# Patient Record
Sex: FEMALE | Race: WHITE | HISPANIC OR LATINO | Employment: UNEMPLOYED | URBAN - METROPOLITAN AREA
[De-identification: names, ages, dates, MRNs, and addresses within clinical notes are randomized per-mention and may not be internally consistent; named-entity substitution may affect disease eponyms.]

---

## 2017-12-15 ENCOUNTER — TRANSCRIBE ORDERS (OUTPATIENT)
Dept: ADMINISTRATIVE | Facility: HOSPITAL | Age: 52
End: 2017-12-15

## 2017-12-15 DIAGNOSIS — Z12.31 ENCOUNTER FOR SCREENING MAMMOGRAM FOR MALIGNANT NEOPLASM OF BREAST: Primary | ICD-10-CM

## 2017-12-15 DIAGNOSIS — R10.32 LLQ PAIN: ICD-10-CM

## 2017-12-22 ENCOUNTER — HOSPITAL ENCOUNTER (OUTPATIENT)
Dept: RADIOLOGY | Age: 52
Discharge: HOME | End: 2017-12-22
Payer: COMMERCIAL

## 2017-12-22 ENCOUNTER — GENERIC CONVERSION - ENCOUNTER (OUTPATIENT)
Dept: OTHER | Facility: OTHER | Age: 52
End: 2017-12-22

## 2017-12-22 DIAGNOSIS — R10.32 LLQ PAIN: ICD-10-CM

## 2017-12-22 DIAGNOSIS — Z12.31 ENCOUNTER FOR SCREENING MAMMOGRAM FOR MALIGNANT NEOPLASM OF BREAST: ICD-10-CM

## 2017-12-22 PROCEDURE — 76830 TRANSVAGINAL US NON-OB: CPT

## 2017-12-22 PROCEDURE — 76856 US EXAM PELVIC COMPLETE: CPT

## 2017-12-22 PROCEDURE — G0202 SCR MAMMO BI INCL CAD: HCPCS

## 2018-01-10 NOTE — PROGRESS NOTES
Assessment   1  Examination, physical, employee (V70 5) (Z02 89)    Discussion/Summary  Counseling Documentation With Imm: The patient was counseled regarding importance of compliance with treatment  Chief Complaint  Chief Complaint Free Text Note Form: Pt here for check up and TB placement      Review of Systems  Complete-Female:   Constitutional: No fever, no chills, feels well, no tiredness, no recent weight gain or weight loss  ENT: no complaints of earache, no loss of hearing, no nose bleeds, no nasal discharge, no sore throat, no hoarseness  Cardiovascular: No complaints of slow heart rate, no fast heart rate, no chest pain, no palpitations, no leg claudication, no lower extremity edema  Respiratory: No complaints of shortness of breath, no wheezing, no cough, no SOB on exertion, no orthopnea, no PND  Gastrointestinal: No complaints of abdominal pain, no constipation, no nausea or vomiting, no diarrhea, no bloody stools  Active Problems   1  Acute Appendicitis (540 9)  2  Colon, diverticulosis (562 10) (K57 30)  3  Diverticulitis of colon (562 11) (K57 32)  4  Edema (782 3) (R60 9)  5  Fatigue (780 79) (R53 83)  6  Morbid or severe obesity due to excess calories (278 01) (E66 01)  7  Nephrolithiasis (592 0) (N20 0)  8  Onychomycosis (110 1) (B35 1)  9  Screening for breast cancer (V76 10) (Z12 39)  10  Screening for cervical cancer (V76 2) (Z12 4)  11  Screening for colorectal cancer (V76 51) (Z12 11,Z12 12)  12  Urinary tract infection (599 0) (N39 0)  13  Vitamin D deficiency (268 9) (E55 9)    Past Medical History  Active Problems And Past Medical History Reviewed: The active problems and past medical history were reviewed and updated today  Surgical History   1  History of Cholecystectomy Laparoscopic  2  History of Hysteroscopy With Resection For Intrauterine Polyp Removal  3  History of Oophorectomy  4  History of Tonsillectomy  5   History of Tubal Ligation  Surgical History Reviewed: The surgical history was reviewed and updated today  Family History   1  Family history of Family Health Status Of Mother - Alive   2  Family history of Father  At Age ___  Family History Reviewed: The family history was reviewed and updated today  Social History    · Never A Smoker   · Never Drank Alcohol  Social History Reviewed: The social history was reviewed and updated today  The social history was reviewed and is unchanged  Current Meds  Medication List Reviewed: The medication list was reviewed and updated today  Allergies   1  Percocet TABS    Vitals  Vital Signs [Data Includes: Current Encounter]    Recorded: [id number] 10:13AM   Temperature 97 7 F, Tympanic   Heart Rate 76   Systolic 301, LUE, Sitting   Diastolic 74, LUE, Sitting   Height 5 ft 2 5 in   Weight 210 lb    BMI Calculated 37 8   BSA Calculated 1 96     Physical Exam    Constitutional   General appearance: No acute distress, well appearing and well nourished  Eyes   Conjunctiva and lids: No swelling, erythema or discharge  Pupils and irises: Equal, round and reactive to light  Ears, Nose, Mouth, and Throat   External inspection of ears and nose: Normal     Otoscopic examination: Tympanic membranes translucent with normal light reflex  Canals patent without erythema  Nasal mucosa, septum, and turbinates: Normal without edema or erythema  Oropharynx: Normal with no erythema, edema, exudate or lesions  Pulmonary   Auscultation of lungs: Clear to auscultation  Cardiovascular   Auscultation of heart: Normal rate and rhythm, normal S1 and S2, without murmurs  Examination of extremities for edema and/or varicosities: Normal     Abdomen   Abdomen: Non-tender, no masses  Liver and spleen: No hepatomegaly or splenomegaly  Lymphatic   Palpation of lymph nodes in neck: No lymphadenopathy           Health Management  Screening for breast cancer   Digital Bilateral Screening Mammogram With CAD; every 1 year; Next Due: 40TAN1045; Overdue  Screening for cervical cancer   (1) THIN PREP PAP WITH IMAGING; every 3 years; Next Due: 38EPD7910; Overdue  Screening for colorectal cancer   COLONOSCOPY; every 10 years; Next Due: 87XPM0693;  Overdue    Signatures   Electronically signed by : Brenda Ross DO; Feb 1 2016 10:31AM EST                       (Author)    Electronically signed by : Brenda Ross DO; Feb 1 2016  2:23PM EST                       (Author)

## 2018-01-16 NOTE — RESULT NOTES
Verified Results  *(Q) VITAMIN D, 25-HYDROXY, LC/MS/MS [id number] 02:16PM Jose Cruz   REPORT COMMENT:  FASTING:YES     Test Name Result Flag Reference   VITAMIN D, 25-OH, TOTAL 28 ng/mL L    Vitamin D Status         25-OH Vitamin D:     Deficiency:                    <20 ng/mL  Insufficiency:             20 - 29 ng/mL  Optimal:                 > or = 30 ng/mL     For 25-OH Vitamin D testing on patients on   D2-supplementation and patients for whom quantitation   of D2 and D3 fractions is required, the [facility]AssureD(TM)  25-OH VIT D, (D2,D3), LC/MS/MS is recommended: order   code 27649 (patients >2yrs)  For more information on this test, go to:  [email or web address]/[email or web address]  (This link is being provided for   informational/educational purposes only )